# Patient Record
Sex: MALE | Race: OTHER | HISPANIC OR LATINO | URBAN - METROPOLITAN AREA
[De-identification: names, ages, dates, MRNs, and addresses within clinical notes are randomized per-mention and may not be internally consistent; named-entity substitution may affect disease eponyms.]

---

## 2018-07-05 ENCOUNTER — OUTPATIENT (OUTPATIENT)
Dept: OUTPATIENT SERVICES | Facility: HOSPITAL | Age: 73
LOS: 1 days | End: 2018-07-05
Payer: COMMERCIAL

## 2018-07-05 VITALS
OXYGEN SATURATION: 96 % | RESPIRATION RATE: 16 BRPM | SYSTOLIC BLOOD PRESSURE: 104 MMHG | DIASTOLIC BLOOD PRESSURE: 54 MMHG | HEART RATE: 72 BPM

## 2018-07-05 VITALS
HEIGHT: 69 IN | TEMPERATURE: 99 F | DIASTOLIC BLOOD PRESSURE: 51 MMHG | OXYGEN SATURATION: 95 % | SYSTOLIC BLOOD PRESSURE: 101 MMHG | RESPIRATION RATE: 16 BRPM | WEIGHT: 180.34 LBS | HEART RATE: 78 BPM

## 2018-07-05 DIAGNOSIS — Z90.2 ACQUIRED ABSENCE OF LUNG [PART OF]: Chronic | ICD-10-CM

## 2018-07-05 DIAGNOSIS — D49.0 NEOPLASM OF UNSPECIFIED BEHAVIOR OF DIGESTIVE SYSTEM: Chronic | ICD-10-CM

## 2018-07-05 DIAGNOSIS — H40.53X3 GLAUCOMA SECONDARY TO OTHER EYE DISORDERS, BILATERAL, SEVERE STAGE: ICD-10-CM

## 2018-07-05 LAB — GLUCOSE BLDC GLUCOMTR-MCNC: 167 MG/DL — HIGH (ref 70–99)

## 2018-07-05 PROCEDURE — C1762: CPT

## 2018-07-05 PROCEDURE — 66180 AQUEOUS SHUNT EYE W/GRAFT: CPT | Mod: LT

## 2018-07-05 PROCEDURE — 67040 LASER TREATMENT OF RETINA: CPT | Mod: LT

## 2018-07-05 PROCEDURE — 82962 GLUCOSE BLOOD TEST: CPT

## 2018-07-05 PROCEDURE — C1783: CPT

## 2018-07-05 PROCEDURE — C1889: CPT

## 2018-07-05 NOTE — ASU PATIENT PROFILE, ADULT - PMH
Chronic coronary artery disease    Congestive heart failure  9/2014  Endogenous hyperlipemia    Hypertension    Ischemic cardiomyopathy    Lung cancer  radiation, chemo via port in right chest  Myocardial infarction  2000/2014/2016 (NONSTEMI)  Peripheral arterial disease    Type 2 diabetes mellitus    VT (ventricular tachycardia)

## 2018-07-05 NOTE — ASU PATIENT PROFILE, ADULT - PSH
History of cardiac pacemaker  DDD/AICD 2014  History of lobectomy of lung  10/2017  Presence of automatic cardioverter/defibrillator (AICD)  2014  Salivary gland tumor  resected  Status post angioplasty with stent  14 cardiac stents placed from 2665-7741  Status post cataract extraction  right eye 2011/ left eye 3/9/2015 History of cardiac pacemaker  DDD/AICD 2014  History of lobectomy of lung  10/2017  Presence of automatic cardioverter/defibrillator (AICD)  2014  Salivary gland tumor  resected  Status post angioplasty with stent  14 cardiac stents placed from 4890-7922  Status post cataract extraction  right eye 2011/ left eye 3/9/2015

## 2020-04-16 NOTE — ASU PREOP CHECKLIST - LOOSE TEETH
Initial SW/CM Assessment/Plan of Care Note     Pt is  and they have a 8 y/o son. His wife has 3 children from previous relationship.   Living Situation: Pt resides with wife, brother, twin 12 y/o step-daughters,13 y/o daughter, and 8 y/o son (Jonatan).   Pt states after dc , he will likely go home  get his stuff and stay with dad thereafter as current marital issues were precipitant to this admission.   Pt does not have current PCP or behavioral health providers.   Provided pt with list of in network therapists and psychiatrists in [facility] near his home. Closest providers are all at [facility] in [place]: [phone]  *Pt gave consent for writer to call and assist with making intake appt .  Also encouraged pt to follow PCP -will search for [facility] provider list near his home:   SANDRA Islas M.D.  Internal Medicine  [address] [address]  [phone]    Insurance  Primary:  BC HMO- [employer]     DC Planning   SW/CM will continue to follow for aftercare needs   no

## 2023-05-30 NOTE — ASU PREOP CHECKLIST - INTERNAL PROSTHESES
Quality 431: Preventive Care And Screening: Unhealthy Alcohol Use - Screening: Patient not identified as an unhealthy alcohol user when screened for unhealthy alcohol use using a systematic screening method Detail Level: Detailed Quality 111:Pneumonia Vaccination Status For Older Adults: Patient received any pneumococcal conjugate or polysaccharide vaccine on or after their 60th birthday and before the end of the measurement period Quality 226: Preventive Care And Screening: Tobacco Use: Screening And Cessation Intervention: Patient screened for tobacco use and is an ex/non-smoker Quality 130: Documentation Of Current Medications In The Medical Record: Current Medications Documented AICD